# Patient Record
Sex: FEMALE | Race: ASIAN
[De-identification: names, ages, dates, MRNs, and addresses within clinical notes are randomized per-mention and may not be internally consistent; named-entity substitution may affect disease eponyms.]

---

## 2020-01-13 ENCOUNTER — HOSPITAL ENCOUNTER (EMERGENCY)
Dept: HOSPITAL 72 - EMR | Age: 85
Discharge: HOME | End: 2020-01-13
Payer: COMMERCIAL

## 2020-01-13 VITALS — DIASTOLIC BLOOD PRESSURE: 64 MMHG | SYSTOLIC BLOOD PRESSURE: 129 MMHG

## 2020-01-13 VITALS — SYSTOLIC BLOOD PRESSURE: 136 MMHG | DIASTOLIC BLOOD PRESSURE: 71 MMHG

## 2020-01-13 VITALS — WEIGHT: 145 LBS | BODY MASS INDEX: 24.75 KG/M2 | HEIGHT: 64 IN

## 2020-01-13 VITALS — DIASTOLIC BLOOD PRESSURE: 71 MMHG | SYSTOLIC BLOOD PRESSURE: 136 MMHG

## 2020-01-13 VITALS — DIASTOLIC BLOOD PRESSURE: 74 MMHG | SYSTOLIC BLOOD PRESSURE: 142 MMHG

## 2020-01-13 DIAGNOSIS — N17.9: Primary | ICD-10-CM

## 2020-01-13 DIAGNOSIS — I10: ICD-10-CM

## 2020-01-13 DIAGNOSIS — E11.9: ICD-10-CM

## 2020-01-13 DIAGNOSIS — N39.0: ICD-10-CM

## 2020-01-13 DIAGNOSIS — I51.7: ICD-10-CM

## 2020-01-13 LAB
ADD MANUAL DIFF: NO
ALBUMIN SERPL-MCNC: 3.1 G/DL (ref 3.4–5)
ALBUMIN/GLOB SERPL: 0.8 {RATIO} (ref 1–2.7)
ALP SERPL-CCNC: 76 U/L (ref 46–116)
ALT SERPL-CCNC: 21 U/L (ref 12–78)
ANION GAP SERPL CALC-SCNC: 5 MMOL/L (ref 5–15)
APPEARANCE UR: (no result)
APTT PPP: (no result) S
AST SERPL-CCNC: 27 U/L (ref 15–37)
BASOPHILS NFR BLD AUTO: 0.4 % (ref 0–2)
BILIRUB SERPL-MCNC: 0.2 MG/DL (ref 0.2–1)
BUN SERPL-MCNC: 31 MG/DL (ref 7–18)
CALCIUM SERPL-MCNC: 8.6 MG/DL (ref 8.5–10.1)
CHLORIDE SERPL-SCNC: 98 MMOL/L (ref 98–107)
CO2 SERPL-SCNC: 35 MMOL/L (ref 21–32)
CREAT SERPL-MCNC: 1.5 MG/DL (ref 0.55–1.3)
EOSINOPHIL NFR BLD AUTO: 0.3 % (ref 0–3)
ERYTHROCYTE [DISTWIDTH] IN BLOOD BY AUTOMATED COUNT: 10.3 % (ref 11.6–14.8)
GLOBULIN SER-MCNC: 4.1 G/DL
GLUCOSE UR STRIP-MCNC: NEGATIVE MG/DL
HCT VFR BLD CALC: 34.3 % (ref 37–47)
HGB BLD-MCNC: 11.7 G/DL (ref 12–16)
KETONES UR QL STRIP: NEGATIVE
LEUKOCYTE ESTERASE UR QL STRIP: (no result)
LYMPHOCYTES NFR BLD AUTO: 26.3 % (ref 20–45)
MCV RBC AUTO: 92 FL (ref 80–99)
MONOCYTES NFR BLD AUTO: 7.8 % (ref 1–10)
NEUTROPHILS NFR BLD AUTO: 65.2 % (ref 45–75)
NITRITE UR QL STRIP: POSITIVE
PH UR STRIP: 7 [PH] (ref 4.5–8)
PHOSPHATE SERPL-MCNC: 3.7 MG/DL (ref 2.5–4.9)
PLATELET # BLD: 265 K/UL (ref 150–450)
POTASSIUM SERPL-SCNC: 4.4 MMOL/L (ref 3.5–5.1)
PROT UR QL STRIP: NEGATIVE
RBC # BLD AUTO: 3.73 M/UL (ref 4.2–5.4)
SODIUM SERPL-SCNC: 138 MMOL/L (ref 136–145)
SP GR UR STRIP: 1 (ref 1–1.03)
UROBILINOGEN UR-MCNC: NORMAL MG/DL (ref 0–1)
WBC # BLD AUTO: 5.9 K/UL (ref 4.8–10.8)

## 2020-01-13 PROCEDURE — 83880 ASSAY OF NATRIURETIC PEPTIDE: CPT

## 2020-01-13 PROCEDURE — 36415 COLL VENOUS BLD VENIPUNCTURE: CPT

## 2020-01-13 PROCEDURE — 81003 URINALYSIS AUTO W/O SCOPE: CPT

## 2020-01-13 PROCEDURE — 84100 ASSAY OF PHOSPHORUS: CPT

## 2020-01-13 PROCEDURE — 83735 ASSAY OF MAGNESIUM: CPT

## 2020-01-13 PROCEDURE — 84484 ASSAY OF TROPONIN QUANT: CPT

## 2020-01-13 PROCEDURE — 80053 COMPREHEN METABOLIC PANEL: CPT

## 2020-01-13 PROCEDURE — 71045 X-RAY EXAM CHEST 1 VIEW: CPT

## 2020-01-13 PROCEDURE — 96365 THER/PROPH/DIAG IV INF INIT: CPT

## 2020-01-13 PROCEDURE — 96361 HYDRATE IV INFUSION ADD-ON: CPT

## 2020-01-13 PROCEDURE — 87086 URINE CULTURE/COLONY COUNT: CPT

## 2020-01-13 PROCEDURE — 80307 DRUG TEST PRSMV CHEM ANLYZR: CPT

## 2020-01-13 PROCEDURE — 93005 ELECTROCARDIOGRAM TRACING: CPT

## 2020-01-13 PROCEDURE — 85025 COMPLETE CBC W/AUTO DIFF WBC: CPT

## 2020-01-13 PROCEDURE — 99284 EMERGENCY DEPT VISIT MOD MDM: CPT

## 2020-01-13 PROCEDURE — 87181 SC STD AGAR DILUTION PER AGT: CPT

## 2020-01-13 NOTE — EMERGENCY ROOM REPORT
History of Present Illness


General


Chief Complaint:  Generalized Weakness


Source:  Patient





Present Illness


HPI


Disclaimer: Please note that this report is being documented using [employer] 

technology. This can lead to erroneous entry secondary to incorrect 

interpretation by the dictating instrument.





HPI: 88-year-old female with a history of diabetes and hypertension presents 

for evaluation of weakness and low blood pressure at home.  She is primarily 

[place]atian speaking.  Per EMS, they were called to the home for low blood pressure 

reading according to family however on their arrival blood pressure was within 

normal limits.  Using a  they stated she has not been eating or 

drinking recently which she has a history of.  The reported failure to thrive 

is part of her medical history as well as hypertension and diabetes and 

unspecified cardiac disease.  Per EMS, family did not report any recent changes 

in her health or medications.  Patient appears somnolent, responding to verbal 

and physical stimuli but not providing any history.  Reportedly, this is her 

baseline per EMS


 


PMH: Hypertension, diabetes


 


PSH: Reviewed


 


Allergies: None listed in medical chart


 


Social Hx: None listed in medical chart


Allergies:  


Coded Allergies:  


     No Known Allergies (Unverified , 12/24/19)





Patient History


Last Menstrual Period:  NA





Nursing Documentation-PMH


Past Medical History:  No History, Except For


Hx Cardiac Problems:  Yes


Hx Hypertension:  Yes


Hx Diabetes:  Yes


Hx Cancer:  No


Hx Gastrointestinal Problems:  No


Hx Neurological Problems:  No





Review of Systems


All Other Systems:  limited - Due to clinical condition





Physical Exam





Vital Signs








  Date Time  Temp Pulse Resp B/P (MAP) Pulse Ox O2 Delivery O2 Flow Rate FiO2


 


1/13/20 16:52 98.1 72 18 138/72 (94) 97 Room Air  





 





General: Awake and alert, no acute distress


HEENT: NC/AT. EOMI. pupils are 1 mm bilaterally.  Mucous membranes are dry


Neck: Supple, trachea midline


Cardiovascular: RRR.  S1 and S2 normal.  No murmur appreciated


Resp: Normal work of breathing. No cough, wheezing or crackles appreciated


Abdomen: Abdomen is soft, nondistended.  Nontender


Skin: Intact.  No abrasions, laceration or rash over the exposed skin


MSK: Normal tone and bulk. Moving all extremities.  No obvious deformity.


Neuro: Somnolent but responds to verbal and physical stimuli.  [place]atian speaking 

only.





Medical Decision Making


Diagnostic Impression:  


 Primary Impression:  


 Acute renal failure


 Additional Impression:  


 UTI (urinary tract infection)


ER Course


88-year-old female with a history of hypertension diabetes presents for 

evaluation of generalized weakness, failure to thrive and reported low blood 

pressure reading by home meter by family.  Differential includes was not 

limited to malnutrition, dehydration, viral syndrome, pneumonia, urinary tract 

infection, medication side effect.  Patient's pupils are pinpoint however she 

has no evidence of narcotic overdose otherwise with stable vital signs, normal 

respirations, responds well to verbal and physical stimuli though there is a 

language barrier.  Start a metabolic infectious work-up provide IV fluids.





Laboratory Tests








Test


  1/13/20


17:10


 


White Blood Count


  5.9 K/UL


(4.8-10.8)


 


Red Blood Count


  3.73 M/UL


(4.20-5.40)  L


 


Hemoglobin


  11.7 G/DL


(12.0-16.0)  L


 


Hematocrit


  34.3 %


(37.0-47.0)  L


 


Mean Corpuscular Volume 92 FL (80-99)  


 


Mean Corpuscular Hemoglobin


  31.4 PG


(27.0-31.0)  H


 


Mean Corpuscular Hemoglobin


Concent 34.1 G/DL


(32.0-36.0)


 


Red Cell Distribution Width


  10.3 %


(11.6-14.8)  L


 


Platelet Count


  265 K/UL


(150-450)


 


Mean Platelet Volume


  5.8 FL


(6.5-10.1)  L


 


Neutrophils (%) (Auto)


  65.2 %


(45.0-75.0)


 


Lymphocytes (%) (Auto)


  26.3 %


(20.0-45.0)


 


Monocytes (%) (Auto)


  7.8 %


(1.0-10.0)


 


Eosinophils (%) (Auto)


  0.3 %


(0.0-3.0)


 


Basophils (%) (Auto)


  0.4 %


(0.0-2.0)


 


Urine Color Pale yellow  


 


Urine Appearance


  Slightly


cloudy


 


Urine pH 7 (4.5-8.0)  


 


Urine Specific Gravity


  1.005


(1.005-1.035)


 


Urine Protein


  Negative


(NEGATIVE)


 


Urine Glucose (UA)


  Negative


(NEGATIVE)


 


Urine Ketones


  Negative


(NEGATIVE)


 


Urine Blood


  Negative


(NEGATIVE)


 


Urine Nitrite


  Positive


(NEGATIVE)  H


 


Urine Bilirubin


  Negative


(NEGATIVE)


 


Urine Urobilinogen


  Normal MG/DL


(0.0-1.0)


 


Urine Leukocyte Esterase


  1+ (NEGATIVE)


H


 


Urine RBC


  0-2 /HPF (0 -


2)


 


Urine WBC


  10-15 /HPF (0


- 2)  H


 


Urine Squamous Epithelial


Cells Many /LPF


(NONE/OCC)  H


 


Urine Bacteria


  Many /HPF


(NONE)  H


 


Sodium Level


  138 MMOL/L


(136-145)


 


Potassium Level


  4.4 MMOL/L


(3.5-5.1)


 


Chloride Level


  98 MMOL/L


()


 


Carbon Dioxide Level


  35 MMOL/L


(21-32)  H


 


Anion Gap


  5 mmol/L


(5-15)


 


Blood Urea Nitrogen


  31 mg/dL


(7-18)  H


 


Creatinine


  1.5 MG/DL


(0.55-1.30)  H


 


Estimate Glomerular


Filtration Rate  mL/min (>60)  


 


 


Glucose Level


  106 MG/DL


()


 


Calcium Level


  8.6 MG/DL


(8.5-10.1)


 


Phosphorus Level


  3.7 MG/DL


(2.5-4.9)


 


Magnesium Level


  2.2 MG/DL


(1.8-2.4)


 


Total Bilirubin


  0.2 MG/DL


(0.2-1.0)


 


Aspartate Amino Transferase


(AST) 27 U/L (15-37)


 


 


Alanine Aminotransferase (ALT)


  21 U/L (12-78)


 


 


Alkaline Phosphatase


  76 U/L


()


 


Troponin I


  0.003 ng/mL


(0.000-0.056)


 


Pro-B-Type Natriuretic Peptide


  159 pg/mL


(0-125)  H


 


Total Protein


  7.2 G/DL


(6.4-8.2)


 


Albumin


  3.1 G/DL


(3.4-5.0)  L


 


Globulin 4.1 g/dL  


 


Albumin/Globulin Ratio


  0.8 (1.0-2.7)


L


 


Urine Opiates Screen


  Negative


(NEGATIVE)


 


Urine Barbiturates Screen


  Negative


(NEGATIVE)


 


Phencyclidine (PCP) Screen


  Negative


(NEGATIVE)


 


Urine Amphetamines Screen


  Negative


(NEGATIVE)


 


Urine Benzodiazepines Screen


  Negative


(NEGATIVE)


 


Urine Cocaine Screen


  Negative


(NEGATIVE)


 


Urine Marijuana (THC) Screen


  Negative


(NEGATIVE)








EKG Diagnostic Results


EKG Time:  17:22


Rate:  normal


Rhythm:  NSR


ST Segments:  no acute changes


Other Impression


Sinus rhythm, left axis deviation, normal intervals, Q waves in III, aVF.  No 

acute ST segment changes.





Rhythm Strip Diag. Results


Rhythm Strip Time:  17:22


EP Interpretation:  yes


Rate:  60s


Rhythm:  NSR, no PVC's, no ectopy





Chest X-Ray Diagnostic Results


Chest X-Ray Diagnostic Results :  


   Chest X-Ray Ordered:  Yes


   # of Views/Limited/Complete:  1 View


   Indication:  Other - Weakness


   Interpretation:  no consolidation, no effusion, no pneumothorax, no acute 

cardiopulmonary disease, other - Cardiomegaly is demonstrated on previous x-ray


   Impression:  No acute disease


   Electronically Signed by:  Electronically signed by Dr. Mitchell Springer


Reevaluation Time:  20:10





Last Vital Signs








  Date Time  Temp Pulse Resp B/P (MAP) Pulse Ox O2 Delivery O2 Flow Rate FiO2


 


1/13/20 16:52 98.1 72 18 138/72 (94) 97 Room Air  








Reevaluation Impression


Labs show positive nitrites and bacteria with presence of white cells as well.  

1+ leukocyte esterase.  Concerning for acute urinary tract infection. 

Ceftriaxone ordered.  Renal function shows a BUN of 1.5 which was the same as 

her discharge however BUN is slightly elevated concerning for acute kidney 

injury.  Will continue IV fluids.  Discussed with her PMDs office,Dr. Alejandro is 

covering for Dr. Mckay, who stated they will arrange for IV antibiotics and 

continued IV fluids to be given in the patient's home tomorrow.  Dr. Alejandro 

recommeding the patient be discharged home and follow-up on an outpatient basis 

to continue treatment for urinary tract infection and MONICO.  Family is agreeable 

with this plan. They are able to take the patient home.  They will bring her 

back with any new or worsening symptoms.


Disposition:  HOME, SELF-CARE


Condition:  Stable


Scripts


Cephalexin* (KEFLEX*) 500 Mg Capsule


500 MG ORAL EVERY 12 HOURS, #14 CAP 0 Refills


   Prov: Mitchell Springer MD         1/13/20











Mitchell Springer MD Jan 13, 2020 17:07

## 2020-01-13 NOTE — NUR
ED Nurse Note: Pt brought in by ambulance from home d/t family complaints of 
low bp, poor PO intake and lethargy over a couple weeks. Pt placed on monitor 
and vital signs stable as documented. /56. Pt SR @ 64. Respirations even 
and unlabored on room air. Pt is very difficult to arouse. Mumbles only a few 
incomprehensible words. Pt in stable condition.

## 2020-01-13 NOTE — NUR
ED Nurse Note:



Recieved report to resume care, pt in bed awake, alert and oriented x 4, pt was 
being admitted but now after speaking with primary pt will be discharged, on 
monitoirng, v/s stable, pt is answering with verbal reposnse, Thai speaking 
only, pt sisters at bedside to assist with translation, IV site intact and 
patent, will continue to monitor and prepare for d/c when care completed.

## 2020-01-14 NOTE — DIAGNOSTIC IMAGING REPORT
Indication: Reason For Exam: AMS

 

Technique: One view of the chest

 

Comparison: 12/24/2019

 

Findings: Heart is borderline enlarged. The left lateral hemidiaphragm is obscured.

The remainder of lungs pleural spaces are clear. The bones are unremarkable

 

Impression: Obscures left lateral hemidiaphragm, could indicate pleural fluid and/or

parenchymal disease of the left lung base. Correlate with clinical findings

 

Otherwise clear lungs.